# Patient Record
Sex: MALE | Race: WHITE | Employment: OTHER | URBAN - METROPOLITAN AREA
[De-identification: names, ages, dates, MRNs, and addresses within clinical notes are randomized per-mention and may not be internally consistent; named-entity substitution may affect disease eponyms.]

---

## 2018-01-04 PROBLEM — M48.062 SPINAL STENOSIS OF LUMBAR REGION WITH NEUROGENIC CLAUDICATION: Status: ACTIVE | Noted: 2018-01-04

## 2018-01-04 PROBLEM — M54.17 LUMBOSACRAL RADICULOPATHY AT L5: Status: ACTIVE | Noted: 2018-01-04

## 2019-11-04 ENCOUNTER — ANESTHESIA EVENT (OUTPATIENT)
Dept: OPERATING ROOM | Age: 69
End: 2019-11-04
Payer: MEDICARE

## 2019-11-05 ENCOUNTER — HOSPITAL ENCOUNTER (OUTPATIENT)
Age: 69
Setting detail: OUTPATIENT SURGERY
Discharge: HOME | End: 2019-11-05
Attending: OPHTHALMOLOGY | Admitting: OPHTHALMOLOGY
Payer: MEDICARE

## 2019-11-05 ENCOUNTER — ANESTHESIA (OUTPATIENT)
Dept: OPERATING ROOM | Age: 69
End: 2019-11-05
Payer: MEDICARE

## 2019-11-05 VITALS — OXYGEN SATURATION: 91 % | DIASTOLIC BLOOD PRESSURE: 91 MMHG | SYSTOLIC BLOOD PRESSURE: 149 MMHG | TEMPERATURE: 98.6 F

## 2019-11-05 VITALS
RESPIRATION RATE: 14 BRPM | DIASTOLIC BLOOD PRESSURE: 84 MMHG | HEIGHT: 71 IN | OXYGEN SATURATION: 96 % | BODY MASS INDEX: 34.02 KG/M2 | SYSTOLIC BLOOD PRESSURE: 153 MMHG | HEART RATE: 76 BPM | WEIGHT: 243 LBS

## 2019-11-05 PROBLEM — H26.9 RIGHT CATARACT: Status: ACTIVE | Noted: 2019-11-05

## 2019-11-05 LAB
EKG ATRIAL RATE: 131 BPM
EKG Q-T INTERVAL: 340 MS
EKG QRS DURATION: 86 MS
EKG QTC CALCULATION (BAZETT): 453 MS
EKG R AXIS: -47 DEGREES
EKG T AXIS: 11 DEGREES
EKG VENTRICULAR RATE: 107 BPM

## 2019-11-05 PROCEDURE — 3700000000 HC ANESTHESIA ATTENDED CARE: Performed by: OPHTHALMOLOGY

## 2019-11-05 PROCEDURE — 6360000002 HC RX W HCPCS: Performed by: NURSE ANESTHETIST, CERTIFIED REGISTERED

## 2019-11-05 PROCEDURE — 7100000010 HC PHASE II RECOVERY - FIRST 15 MIN: Performed by: OPHTHALMOLOGY

## 2019-11-05 PROCEDURE — 2500000003 HC RX 250 WO HCPCS: Performed by: OPHTHALMOLOGY

## 2019-11-05 PROCEDURE — 2580000003 HC RX 258: Performed by: ANESTHESIOLOGY

## 2019-11-05 PROCEDURE — 2500000003 HC RX 250 WO HCPCS: Performed by: NURSE ANESTHETIST, CERTIFIED REGISTERED

## 2019-11-05 PROCEDURE — 7100000000 HC PACU RECOVERY - FIRST 15 MIN: Performed by: OPHTHALMOLOGY

## 2019-11-05 PROCEDURE — 6370000000 HC RX 637 (ALT 250 FOR IP): Performed by: OPHTHALMOLOGY

## 2019-11-05 PROCEDURE — 93005 ELECTROCARDIOGRAM TRACING: CPT | Performed by: ANESTHESIOLOGY

## 2019-11-05 PROCEDURE — 2709999900 HC NON-CHARGEABLE SUPPLY: Performed by: OPHTHALMOLOGY

## 2019-11-05 PROCEDURE — 7100000001 HC PACU RECOVERY - ADDTL 15 MIN: Performed by: OPHTHALMOLOGY

## 2019-11-05 PROCEDURE — V2632 POST CHMBR INTRAOCULAR LENS: HCPCS | Performed by: OPHTHALMOLOGY

## 2019-11-05 PROCEDURE — 3600000003 HC SURGERY LEVEL 3 BASE: Performed by: OPHTHALMOLOGY

## 2019-11-05 PROCEDURE — 7100000011 HC PHASE II RECOVERY - ADDTL 15 MIN: Performed by: OPHTHALMOLOGY

## 2019-11-05 DEVICE — LENS INTOCU +21.0 DIOPT A CONSTANT 118.8 L13MM DIA6MM 0DEG: Type: IMPLANTABLE DEVICE | Site: EYE | Status: FUNCTIONAL

## 2019-11-05 RX ORDER — LABETALOL 20 MG/4 ML (5 MG/ML) INTRAVENOUS SYRINGE
PRN
Status: DISCONTINUED | OUTPATIENT
Start: 2019-11-05 | End: 2019-11-05 | Stop reason: SDUPTHER

## 2019-11-05 RX ORDER — FLURBIPROFEN SODIUM 0.3 MG/ML
1 SOLUTION/ DROPS OPHTHALMIC
Status: COMPLETED | OUTPATIENT
Start: 2019-11-05 | End: 2019-11-05

## 2019-11-05 RX ORDER — TETRACAINE HYDROCHLORIDE 5 MG/ML
SOLUTION OPHTHALMIC PRN
Status: DISCONTINUED | OUTPATIENT
Start: 2019-11-05 | End: 2019-11-05 | Stop reason: ALTCHOICE

## 2019-11-05 RX ORDER — CYCLOPENTOLATE HYDROCHLORIDE 10 MG/ML
1 SOLUTION/ DROPS OPHTHALMIC
Status: COMPLETED | OUTPATIENT
Start: 2019-11-05 | End: 2019-11-05

## 2019-11-05 RX ORDER — PHENYLEPHRINE HYDROCHLORIDE 100 MG/ML
1 SOLUTION/ DROPS OPHTHALMIC PRN
Status: DISCONTINUED | OUTPATIENT
Start: 2019-11-05 | End: 2019-11-05 | Stop reason: HOSPADM

## 2019-11-05 RX ORDER — SODIUM CHLORIDE, SODIUM LACTATE, POTASSIUM CHLORIDE, CALCIUM CHLORIDE 600; 310; 30; 20 MG/100ML; MG/100ML; MG/100ML; MG/100ML
INJECTION, SOLUTION INTRAVENOUS CONTINUOUS
Status: DISCONTINUED | OUTPATIENT
Start: 2019-11-05 | End: 2019-11-05 | Stop reason: HOSPADM

## 2019-11-05 RX ORDER — BALANCED SALT SOLUTION 6.4; .75; .48; .3; 3.9; 1.7 MG/ML; MG/ML; MG/ML; MG/ML; MG/ML; MG/ML
SOLUTION OPHTHALMIC PRN
Status: DISCONTINUED | OUTPATIENT
Start: 2019-11-05 | End: 2019-11-05 | Stop reason: ALTCHOICE

## 2019-11-05 RX ORDER — MIDAZOLAM HYDROCHLORIDE 1 MG/ML
INJECTION INTRAMUSCULAR; INTRAVENOUS PRN
Status: DISCONTINUED | OUTPATIENT
Start: 2019-11-05 | End: 2019-11-05 | Stop reason: SDUPTHER

## 2019-11-05 RX ORDER — TETRACAINE HYDROCHLORIDE 5 MG/ML
1 SOLUTION OPHTHALMIC ONCE
Status: COMPLETED | OUTPATIENT
Start: 2019-11-05 | End: 2019-11-05

## 2019-11-05 RX ORDER — FENTANYL CITRATE 50 UG/ML
INJECTION, SOLUTION INTRAMUSCULAR; INTRAVENOUS PRN
Status: DISCONTINUED | OUTPATIENT
Start: 2019-11-05 | End: 2019-11-05 | Stop reason: SDUPTHER

## 2019-11-05 RX ORDER — PHENYLEPHRINE HCL 2.5 %
1 DROPS OPHTHALMIC (EYE)
Status: COMPLETED | OUTPATIENT
Start: 2019-11-05 | End: 2019-11-05

## 2019-11-05 RX ADMIN — CYCLOPENTOLATE HYDROCHLORIDE 1 DROP: 10 SOLUTION/ DROPS OPHTHALMIC at 07:14

## 2019-11-05 RX ADMIN — CYCLOPENTOLATE HYDROCHLORIDE 1 DROP: 10 SOLUTION/ DROPS OPHTHALMIC at 07:20

## 2019-11-05 RX ADMIN — FENTANYL CITRATE 50 MCG: 50 INJECTION, SOLUTION INTRAMUSCULAR; INTRAVENOUS at 08:17

## 2019-11-05 RX ADMIN — CYCLOPENTOLATE HYDROCHLORIDE 1 DROP: 10 SOLUTION/ DROPS OPHTHALMIC at 07:25

## 2019-11-05 RX ADMIN — FLURBIPROFEN SODIUM 1 DROP: 0.3 SOLUTION/ DROPS OPHTHALMIC at 07:25

## 2019-11-05 RX ADMIN — PHENYLEPHRINE HYDROCHLORIDE 1 DROP: 25 SOLUTION/ DROPS OPHTHALMIC at 07:14

## 2019-11-05 RX ADMIN — FLURBIPROFEN SODIUM 1 DROP: 0.3 SOLUTION/ DROPS OPHTHALMIC at 07:15

## 2019-11-05 RX ADMIN — FENTANYL CITRATE 50 MCG: 50 INJECTION, SOLUTION INTRAMUSCULAR; INTRAVENOUS at 08:19

## 2019-11-05 RX ADMIN — TETRACAINE HYDROCHLORIDE 1 DROP: 5 SOLUTION OPHTHALMIC at 07:16

## 2019-11-05 RX ADMIN — LABETALOL 20 MG/4 ML (5 MG/ML) INTRAVENOUS SYRINGE 5 MG: at 08:23

## 2019-11-05 RX ADMIN — PHENYLEPHRINE HYDROCHLORIDE 1 DROP: 25 SOLUTION/ DROPS OPHTHALMIC at 07:20

## 2019-11-05 RX ADMIN — SODIUM CHLORIDE, POTASSIUM CHLORIDE, SODIUM LACTATE AND CALCIUM CHLORIDE: 600; 310; 30; 20 INJECTION, SOLUTION INTRAVENOUS at 07:13

## 2019-11-05 RX ADMIN — FLURBIPROFEN SODIUM 1 DROP: 0.3 SOLUTION/ DROPS OPHTHALMIC at 07:20

## 2019-11-05 RX ADMIN — PHENYLEPHRINE HYDROCHLORIDE 1 DROP: 25 SOLUTION/ DROPS OPHTHALMIC at 07:25

## 2019-11-05 RX ADMIN — MIDAZOLAM 2 MG: 1 INJECTION INTRAMUSCULAR; INTRAVENOUS at 08:17

## 2019-11-05 ASSESSMENT — PULMONARY FUNCTION TESTS
PIF_VALUE: 1
PIF_VALUE: 0
PIF_VALUE: 1
PIF_VALUE: 0

## 2019-11-05 ASSESSMENT — PAIN SCALES - GENERAL
PAINLEVEL_OUTOF10: 0

## 2019-11-05 ASSESSMENT — PAIN - FUNCTIONAL ASSESSMENT: PAIN_FUNCTIONAL_ASSESSMENT: 0-10

## 2020-09-16 RX ORDER — MULTIVIT WITH MINERALS/LUTEIN
250 TABLET ORAL DAILY
COMMUNITY

## 2020-09-17 ENCOUNTER — HOSPITAL ENCOUNTER (OUTPATIENT)
Age: 70
Discharge: HOME | End: 2020-09-19
Payer: MEDICARE

## 2020-09-17 PROCEDURE — U0003 INFECTIOUS AGENT DETECTION BY NUCLEIC ACID (DNA OR RNA); SEVERE ACUTE RESPIRATORY SYNDROME CORONAVIRUS 2 (SARS-COV-2) (CORONAVIRUS DISEASE [COVID-19]), AMPLIFIED PROBE TECHNIQUE, MAKING USE OF HIGH THROUGHPUT TECHNOLOGIES AS DESCRIBED BY CMS-2020-01-R: HCPCS

## 2020-09-19 ENCOUNTER — ANESTHESIA EVENT (OUTPATIENT)
Dept: OPERATING ROOM | Age: 70
End: 2020-09-19
Payer: MEDICARE

## 2020-09-19 LAB
SARS-COV-2: NOT DETECTED
SOURCE: NORMAL

## 2020-09-19 NOTE — ANESTHESIA PRE PROCEDURE
Department of Anesthesiology  Preprocedure Note       Name:  Mily Abdalla   Age:  65 y.o.  :  1950                                          MRN:  [id number]         Date:  2020      Surgeon: Nickolas Carreon):  Jenaro Hathaway MD    Procedure: LEFT EYE CATARACT EMULSIFICATION IOL IMPLANT (PT REQUESTS TO BE LAST) (Left )    Medications prior to admission:   Prior to Admission medications    Medication Sig Start Date End Date Taking?  Authorizing Provider   Ascorbic Acid (VITAMIN C) 250 MG tablet Take 250 mg by mouth daily   Yes Historical Provider, MD   olmesartan-hydrochlorothiazide (BENICAR HCT) 40-12.5 MG per tablet Take 1 tablet by mouth Daily with lunch     Historical Provider, MD   diltiazem (DILACOR XR) 240 MG XR capsule Take 240 mg by mouth nightly     Historical Provider, MD       Current medications:    Current Facility-Administered Medications   Medication Dose Route Frequency Provider Last Rate Last Dose    lactated ringers infusion   Intravenous Continuous Darianaignacio Casanova MD        flurbiprofen (OCUFEN) 0.03 % ophthalmic solution 1 drop  1 drop Left Eye Q5 Min Jenaro Hathaway MD        phenylephrine (MYDFRIN) 2.5 % ophthalmic solution 1 drop  1 drop Left Eye Q5 Min Jenaro Hathaway MD        tetracaine (TETRAVISC) 0.5 % ophthalmic solution 1 drop  1 drop Left Eye Once Jenaro Hathaway MD        cyclopentolate (CYCLOGYL) 1 % ophthalmic solution 1 drop  1 drop Left Eye Q5 Min Jenaro Hathaway MD        phenylephrine (ADRIANE-SYNEPHRINE) 10 % ophthalmic solution 1 drop  1 drop Left Eye PRN Jenaro Hathaway MD           Allergies:  No Known Allergies    Problem List:    Patient Active Problem List   Diagnosis Code    DJD (degenerative joint disease) of knee M17.10    Right knee pain M25.561    Sleep apnea G47.30    HTN (hypertension) I10    Hypercholesterolemia E78.00    Atrial fibrillation (HCC) I48.91    H/O total knee replacement Z96.659    Spinal stenosis of lumbar region with neurogenic claudication M48.062    Lumbosacral radiculopathy at L5 M54.17    Right cataract H26.9       Past Medical History:        Diagnosis Date    A-fib [facility])     x1 isolated episode in 2009 ( controlled with diltiazem)  pt converted back on his own     Arthritis     Colon polyps     Gout     HTN (hypertension)     Hypercholesterolemia     Sleep apnea     uses a c-pap at night       Past Surgical History:        Procedure Laterality Date    COLONOSCOPY      ENDOSCOPY, COLON, DIAGNOSTIC      INTRACAPSULAR CATARACT EXTRACTION Right 11/5/2019    RIGHT EYE CATARACT EMULSIFICATION IOL IMPLANT performed by Ag Mckeon MD at [address] Left 2007    knee-Yady    JOINT REPLACEMENT Right 2014    knee    REVISION TOTAL KNEE ARTHROPLASTY      TONSILLECTOMY         Social History:    Social History     Tobacco Use    Smoking status: Never Smoker    Smokeless tobacco: Never Used   Substance Use Topics    Alcohol use:  Yes     Alcohol/week: 0.0 standard drinks     Types: 1 - 2 Cans of beer per week     Comment: per week                                Counseling given: Not Answered      Vital Signs (Current):   Vitals:    09/16/20 1103 09/22/20 0920   BP:  (!) 159/71   Pulse:  61   Resp:  20   Temp:  97.8 °F (36.6 °C)   TempSrc:  Temporal   SpO2:  96%   Weight: 240 lb (108.9 kg) 243 lb (110.2 kg)   Height: 5' 11\" (1.803 m) 5' 11\" (1.803 m)                                              BP Readings from Last 3 Encounters:   09/22/20 (!) 159/71   11/05/19 (!) 149/91   11/05/19 (!) 153/84       NPO Status: Time of last liquid consumption: 2100                        Time of last solid consumption: 2100                        Date of last liquid consumption: 09/21/20                        Date of last solid food consumption: 09/21/20    BMI:   Wt Readings from Last 3 Encounters:   09/22/20 243 lb (110.2 kg)   11/05/19 243 lb (110.2 kg)   01/30/18 242 lb (109.8 kg)     Body mass Review:  Chart reviewed per routine on September 19, 2020 at 8:14 AM by Tj Lange MD.  (Final assessment and plan per day of surgery team.)    DOS STAFF ADDENDUM:    Pt seen and examined, chart reviewed (including anesthesia, drug and allergy history). Anesthetic plan, risks, benefits, alternatives, and personnel involved discussed with patient. Patient verbalized an understanding and agrees to proceed. Plan discussed with care team members and agreed upon.     Silvina Rosado MD  Staff Anesthesiologist  9:29 AM

## 2020-09-21 NOTE — H&P
History and Physical    Patient's Name/Date of Birth: Brian White / 1950 (67 y.o.)    Date: September 21, 2020     Chief Complaint: Decreased vision of the left eye    HPI: Mature left cataract with decreased vision . Risks and complications as well as options and benefits were discussed with the patient and he has elected to proceed with left cataract extraction with intra ocular lens implant. Past Medical History:   Diagnosis Date    A-fib [facility])     x1 isolated episode in 2009 ( controlled with diltiazem)  pt converted back on his own     Arthritis     Colon polyps     Gout     HTN (hypertension)     Hypercholesterolemia     Sleep apnea     uses a c-pap at night       Past Surgical History:   Procedure Laterality Date    COLONOSCOPY      ENDOSCOPY, COLON, DIAGNOSTIC      INTRACAPSULAR CATARACT EXTRACTION Right 11/5/2019    RIGHT EYE CATARACT EMULSIFICATION IOL IMPLANT performed by Carla Hardy MD at [address] Left 2007    knee-Yady    JOINT REPLACEMENT Right 2014    knee    REVISION TOTAL KNEE ARTHROPLASTY      TONSILLECTOMY         Prior to Admission medications    Medication Sig Start Date End Date Taking? Authorizing Provider   Ascorbic Acid (VITAMIN C) 250 MG tablet Take 250 mg by mouth daily   Yes Historical Provider, MD   olmesartan-hydrochlorothiazide (BENICAR HCT) 40-12.5 MG per tablet Take 1 tablet by mouth Daily with lunch     Historical Provider, MD   diltiazem (DILACOR XR) 240 MG XR capsule Take 240 mg by mouth nightly     Historical Provider, MD       Patient has no known allergies.     Family History   Problem Relation Age of Onset    Arthritis Mother     Heart Disease Mother        Social History     Socioeconomic History    Marital status:      Spouse name: Not on file    Number of children: Not on file    Years of education: Not on file    Highest education level: Not on file   Occupational History    Not on file   Social Needs  Financial resource strain: Not on file   Moisés-Sofy insecurity     Worry: Not on file     Inability: Not on file   [employer] needs     Medical: Not on file     Non-medical: Not on file   Tobacco Use    Smoking status: Never Smoker    Smokeless tobacco: Never Used   Substance and Sexual Activity    Alcohol use: Yes     Alcohol/week: 0.0 standard drinks     Types: 1 - 2 Cans of beer per week     Comment: per week    Drug use: No    Sexual activity: Not on file   Lifestyle    Physical activity     Days per week: Not on file     Minutes per session: Not on file    Stress: Not on file   Relationships    Social connections     Talks on phone: Not on file     Gets together: Not on file     Attends Buddhism service: Not on file     Active member of club or organization: Not on file     Attends meetings of clubs or organizations: Not on file     Relationship status: Not on file    Intimate partner violence     Fear of current or ex partner: Not on file     Emotionally abused: Not on file     Physically abused: Not on file     Forced sexual activity: Not on file   Other Topics Concern    Not on file   Social History Narrative    Not on file       Review of Systems:   CONSTITUTIONAL: Oriented to person, place and time  EYES:  Mature left cataract with decreased vision effecting reading, driving and all routine home activities.   Visual acuity is 20/60   HEENT:  negative  RESPIRATORY:  negative  CARDIOVASCULAR:  negative  GASTROINTESTINAL:  negative  GENITOURINARY:  negative  INTEGUMENT/BREAST:  negative  HEMATOLOGIC/LYMPHATIC:  negative  ALLERGIC/IMMUNOLOGIC:  negative  ENDOCRINE:  negative  MUSCULOSKELETAL:  negative  NEUROLOGICAL:  negative  BEHAVIOR/PSYCH:  negative    Physical Exam:  Vitals:    09/16/20 1103   Weight: 240 lb (108.9 kg)   Height: 5' 11\" (1.803 m)       CONSTITUTIONAL:  awake, alert, cooperative, no apparent distress, and appears stated age  EYES:  Lids and lashes normal, pupils equal, round and reactive to light, extra ocular muscles intact, sclera clear, conjunctiva normal  ENT:  Normocephalic, without obvious abnormality, atraumatic, sinuses nontender on palpation, external ears without lesions, oral pharynx with moist mucus membranes, tonsils without erythema or exudates, gums normal and good dentition. NECK:  Supple, symmetrical, trachea midline, no adenopathy, thyroid symmetric, not enlarged and no tenderness, skin normal  HEMATOLOGIC/LYMPHATICS:  no cervical lymphadenopathy and no supraclavicular lymphadenopathy  BACK:  Symmetric, no curvature, spinous processes are non-tender on palpation, paraspinous muscles are non-tender on palpation, no costal vertebral tenderness  LUNGS:  No increased work of breathing, good air exchange, clear to auscultation bilaterally, no crackles or wheezing  CARDIOVASCULAR:  Normal apical impulse, regular rate and rhythm, normal S1 and S2, no S3 or S4, and no murmur noted  ABDOMEN:  No scars, normal bowel sounds, soft, non-distended, non-tender, no masses palpated, no hepatosplenomegally  CHEST/BREASTS:  Breasts symmetrical, skin without lesion(s), no nipple retraction or dimpling, no nipple discharge, no masses palpated, no axillary or supraclavicular adenopathy  GENITAL/URINARY:  deferred  MUSCULOSKELETAL:  There is no redness, warmth, or swelling of the joints. Full range of motion noted. Motor strength is 5 out of 5 all extremities bilaterally. Tone is normal.  NEUROLOGIC:  Awake, alert, oriented to name, place and time. Cranial nerves II-XII are grossly intact. Motor is 5 out of 5 bilaterally. Cerebellar finger to nose, heel to shin intact. Sensory is intact. Babinski down going, Romberg negative, and gait is normal.  SKIN:  no bruising or bleeding, normal skin color, texture, turgor and no redness, warmth, or swelling    Assessment:  Active Problems:    * No active hospital problems. *  Resolved Problems:    * No resolved hospital problems. *      Plan:  1.  Left cataract extraction with intra ocular lens implant    Electronically signed by Wes Deluca MD on 9/21/20 at 4:57 PM EDT

## 2020-09-22 ENCOUNTER — HOSPITAL ENCOUNTER (OUTPATIENT)
Age: 70
Setting detail: OUTPATIENT SURGERY
Discharge: HOME | End: 2020-09-22
Attending: OPHTHALMOLOGY | Admitting: OPHTHALMOLOGY
Payer: MEDICARE

## 2020-09-22 ENCOUNTER — ANESTHESIA (OUTPATIENT)
Dept: OPERATING ROOM | Age: 70
End: 2020-09-22
Payer: MEDICARE

## 2020-09-22 VITALS
RESPIRATION RATE: 16 BRPM | HEART RATE: 60 BPM | SYSTOLIC BLOOD PRESSURE: 140 MMHG | HEIGHT: 71 IN | DIASTOLIC BLOOD PRESSURE: 70 MMHG | OXYGEN SATURATION: 95 % | WEIGHT: 243 LBS | TEMPERATURE: 97 F | BODY MASS INDEX: 34.02 KG/M2

## 2020-09-22 VITALS
OXYGEN SATURATION: 99 % | RESPIRATION RATE: 14 BRPM | DIASTOLIC BLOOD PRESSURE: 81 MMHG | SYSTOLIC BLOOD PRESSURE: 147 MMHG

## 2020-09-22 PROBLEM — H26.9 LEFT CATARACT: Status: ACTIVE | Noted: 2020-09-22

## 2020-09-22 PROCEDURE — 2580000003 HC RX 258: Performed by: ANESTHESIOLOGY

## 2020-09-22 PROCEDURE — 2500000003 HC RX 250 WO HCPCS: Performed by: NURSE ANESTHETIST, CERTIFIED REGISTERED

## 2020-09-22 PROCEDURE — 7100000010 HC PHASE II RECOVERY - FIRST 15 MIN: Performed by: OPHTHALMOLOGY

## 2020-09-22 PROCEDURE — 2500000003 HC RX 250 WO HCPCS: Performed by: OPHTHALMOLOGY

## 2020-09-22 PROCEDURE — 6370000000 HC RX 637 (ALT 250 FOR IP): Performed by: OPHTHALMOLOGY

## 2020-09-22 PROCEDURE — 7100000011 HC PHASE II RECOVERY - ADDTL 15 MIN: Performed by: OPHTHALMOLOGY

## 2020-09-22 PROCEDURE — V2632 POST CHMBR INTRAOCULAR LENS: HCPCS | Performed by: OPHTHALMOLOGY

## 2020-09-22 PROCEDURE — 2709999900 HC NON-CHARGEABLE SUPPLY: Performed by: OPHTHALMOLOGY

## 2020-09-22 PROCEDURE — 3600000003 HC SURGERY LEVEL 3 BASE: Performed by: OPHTHALMOLOGY

## 2020-09-22 PROCEDURE — 6360000002 HC RX W HCPCS: Performed by: NURSE ANESTHETIST, CERTIFIED REGISTERED

## 2020-09-22 PROCEDURE — 3700000000 HC ANESTHESIA ATTENDED CARE: Performed by: OPHTHALMOLOGY

## 2020-09-22 DEVICE — LENS INTOCU +21.0 DIOPT A CONSTANT 118.8 L13MM DIA6MM 0DEG: Type: IMPLANTABLE DEVICE | Site: EYE | Status: FUNCTIONAL

## 2020-09-22 RX ORDER — PHENYLEPHRINE HCL 2.5 %
1 DROPS OPHTHALMIC (EYE)
Status: COMPLETED | OUTPATIENT
Start: 2020-09-22 | End: 2020-09-22

## 2020-09-22 RX ORDER — BALANCED SALT SOLUTION 6.4; .75; .48; .3; 3.9; 1.7 MG/ML; MG/ML; MG/ML; MG/ML; MG/ML; MG/ML
SOLUTION OPHTHALMIC PRN
Status: DISCONTINUED | OUTPATIENT
Start: 2020-09-22 | End: 2020-09-22 | Stop reason: ALTCHOICE

## 2020-09-22 RX ORDER — CYCLOPENTOLATE HYDROCHLORIDE 10 MG/ML
1 SOLUTION/ DROPS OPHTHALMIC
Status: COMPLETED | OUTPATIENT
Start: 2020-09-22 | End: 2020-09-22

## 2020-09-22 RX ORDER — TETRACAINE HYDROCHLORIDE 5 MG/ML
1 SOLUTION OPHTHALMIC ONCE
Status: COMPLETED | OUTPATIENT
Start: 2020-09-22 | End: 2020-09-22

## 2020-09-22 RX ORDER — TETRACAINE HYDROCHLORIDE 5 MG/ML
SOLUTION OPHTHALMIC PRN
Status: DISCONTINUED | OUTPATIENT
Start: 2020-09-22 | End: 2020-09-22 | Stop reason: ALTCHOICE

## 2020-09-22 RX ORDER — MIDAZOLAM HYDROCHLORIDE 1 MG/ML
INJECTION INTRAMUSCULAR; INTRAVENOUS PRN
Status: DISCONTINUED | OUTPATIENT
Start: 2020-09-22 | End: 2020-09-22 | Stop reason: SDUPTHER

## 2020-09-22 RX ORDER — ALFENTANIL HYDROCHLORIDE 500 UG/ML
INJECTION INTRAVENOUS PRN
Status: DISCONTINUED | OUTPATIENT
Start: 2020-09-22 | End: 2020-09-22 | Stop reason: SDUPTHER

## 2020-09-22 RX ORDER — FLURBIPROFEN SODIUM 0.3 MG/ML
1 SOLUTION/ DROPS OPHTHALMIC
Status: COMPLETED | OUTPATIENT
Start: 2020-09-22 | End: 2020-09-22

## 2020-09-22 RX ORDER — SODIUM CHLORIDE, SODIUM LACTATE, POTASSIUM CHLORIDE, CALCIUM CHLORIDE 600; 310; 30; 20 MG/100ML; MG/100ML; MG/100ML; MG/100ML
INJECTION, SOLUTION INTRAVENOUS CONTINUOUS
Status: DISCONTINUED | OUTPATIENT
Start: 2020-09-22 | End: 2020-09-22 | Stop reason: HOSPADM

## 2020-09-22 RX ORDER — PHENYLEPHRINE HYDROCHLORIDE 100 MG/ML
1 SOLUTION/ DROPS OPHTHALMIC PRN
Status: DISCONTINUED | OUTPATIENT
Start: 2020-09-22 | End: 2020-09-22 | Stop reason: HOSPADM

## 2020-09-22 RX ADMIN — PHENYLEPHRINE HYDROCHLORIDE 1 DROP: 25 SOLUTION/ DROPS OPHTHALMIC at 09:15

## 2020-09-22 RX ADMIN — CYCLOPENTOLATE HYDROCHLORIDE 1 DROP: 10 SOLUTION/ DROPS OPHTHALMIC at 09:05

## 2020-09-22 RX ADMIN — FLURBIPROFEN SODIUM 1 DROP: 0.3 SOLUTION/ DROPS OPHTHALMIC at 09:15

## 2020-09-22 RX ADMIN — SODIUM CHLORIDE, POTASSIUM CHLORIDE, SODIUM LACTATE AND CALCIUM CHLORIDE: 600; 310; 30; 20 INJECTION, SOLUTION INTRAVENOUS at 09:30

## 2020-09-22 RX ADMIN — CYCLOPENTOLATE HYDROCHLORIDE 1 DROP: 10 SOLUTION/ DROPS OPHTHALMIC at 09:10

## 2020-09-22 RX ADMIN — TETRACAINE HYDROCHLORIDE 1 DROP: 25 LIQUID OPHTHALMIC at 09:31

## 2020-09-22 RX ADMIN — PHENYLEPHRINE HYDROCHLORIDE 1 DROP: 25 SOLUTION/ DROPS OPHTHALMIC at 09:10

## 2020-09-22 RX ADMIN — ALFENTANIL HYDROCHLORIDE 250 MCG: 500 INJECTION INTRAVENOUS at 10:08

## 2020-09-22 RX ADMIN — FLURBIPROFEN SODIUM 1 DROP: 0.3 SOLUTION/ DROPS OPHTHALMIC at 09:05

## 2020-09-22 RX ADMIN — ALFENTANIL HYDROCHLORIDE 250 MCG: 500 INJECTION INTRAVENOUS at 10:13

## 2020-09-22 RX ADMIN — MIDAZOLAM 1 MG: 1 INJECTION INTRAMUSCULAR; INTRAVENOUS at 10:06

## 2020-09-22 RX ADMIN — ALFENTANIL HYDROCHLORIDE 250 MCG: 500 INJECTION INTRAVENOUS at 10:06

## 2020-09-22 RX ADMIN — ALFENTANIL HYDROCHLORIDE 250 MCG: 500 INJECTION INTRAVENOUS at 10:09

## 2020-09-22 RX ADMIN — MIDAZOLAM 1 MG: 1 INJECTION INTRAMUSCULAR; INTRAVENOUS at 10:05

## 2020-09-22 RX ADMIN — PHENYLEPHRINE HYDROCHLORIDE 1 DROP: 25 SOLUTION/ DROPS OPHTHALMIC at 09:05

## 2020-09-22 RX ADMIN — CYCLOPENTOLATE HYDROCHLORIDE 1 DROP: 10 SOLUTION/ DROPS OPHTHALMIC at 09:15

## 2020-09-22 RX ADMIN — FLURBIPROFEN SODIUM 1 DROP: 0.3 SOLUTION/ DROPS OPHTHALMIC at 09:10

## 2020-09-22 ASSESSMENT — PAIN SCALES - GENERAL
PAINLEVEL_OUTOF10: 0

## 2020-09-22 ASSESSMENT — PAIN - FUNCTIONAL ASSESSMENT: PAIN_FUNCTIONAL_ASSESSMENT: 0-10

## 2020-09-22 NOTE — OP NOTE
into the capsular bag and dialed to 3 and 9 o'clock positions. Healon was removed from in front of and behind the lens. The lens was found to be well centered, well positioned in the bag and stable. The wound was checked and found to be airtight and watertight. The lid speculum was removed and the drape was removed. The patient was brought to the recovery room in excellent condition, given postoperative instructions, and discharge in excellent condition.    Operative Note      Patient: Wendy Causey  YOB: 1950  MRN: [id number]    Date of Procedure: 9/22/2020    Pre-Op Diagnosis: LEFT EYE CATARACT    Post-Op Diagnosis: Same       Procedure(s):  LEFT EYE CATARACT EMULSIFICATION IOL IMPLANT (PT REQUESTS TO BE LAST)    Surgeon(s):  Tom Haines MD    Assistant:   * No surgical staff found *    Anesthesia: Monitor Anesthesia Care    Estimated Blood Loss (mL): Minimal    Complications: None    Specimens:   * No specimens in log *    Implants:  * No implants in log *      Drains: * No LDAs found *    Findings: Left cataract     Detailed Description of Procedure:   Left cataract extraction with intra ocular lens implant    Electronically signed by Grayce Merlin, MD on 9/22/2020 at 10:10 AM

## 2020-09-22 NOTE — H&P
Update History & Physical    The patient's History and Physical of 9 / 21 / 2020 was reviewed with the patient and there were no significant changes. I examined the patient and there were no significant changes from the previous History and Physical.    Plan: The risk, benefits, expected outcome, and alternative to the recommended procedure have been discussed with the patient. Patient understands and wants to proceed with the procedure.     Electronically signed by Robinson Caputo MD on 9/22/20 at 10:09 AM EDT

## 2021-12-03 ENCOUNTER — HOSPITAL ENCOUNTER (OUTPATIENT)
Dept: CT IMAGING | Age: 71
Discharge: HOME | End: 2021-12-03
Payer: MEDICARE

## 2021-12-03 DIAGNOSIS — I65.22 OCCLUSION OF LEFT CAROTID ARTERY: ICD-10-CM

## 2021-12-03 LAB
BUN BLDV-MCNC: 18 MG/DL (ref 6–23)
CREAT SERPL-MCNC: 1.1 MG/DL (ref 0.7–1.2)
GFR AFRICAN AMERICAN: >60
GFR NON-AFRICAN AMERICAN: >60 ML/MIN/1.73

## 2021-12-03 PROCEDURE — 84520 ASSAY OF UREA NITROGEN: CPT

## 2021-12-03 PROCEDURE — 82565 ASSAY OF CREATININE: CPT

## 2021-12-03 PROCEDURE — 6360000004 HC RX CONTRAST MEDICATION: Performed by: RADIOLOGY

## 2021-12-03 PROCEDURE — 36415 COLL VENOUS BLD VENIPUNCTURE: CPT

## 2021-12-03 PROCEDURE — 70498 CT ANGIOGRAPHY NECK: CPT

## 2021-12-03 RX ADMIN — IOPAMIDOL 75 ML: 755 INJECTION, SOLUTION INTRAVENOUS at 14:06

## 2022-06-29 ENCOUNTER — TELEPHONE (OUTPATIENT)
Dept: SLEEP MEDICINE | Age: 72
End: 2022-06-29

## 2022-06-29 NOTE — TELEPHONE ENCOUNTER
Pt called in asking if we received referral from her PCP.   We did receive referral and appt was made for 8/9

## 2022-08-09 ENCOUNTER — OFFICE VISIT (OUTPATIENT)
Dept: SLEEP CENTER | Age: 72
End: 2022-08-09
Payer: MEDICARE

## 2022-08-09 VITALS
RESPIRATION RATE: 16 BRPM | HEIGHT: 71 IN | HEART RATE: 65 BPM | SYSTOLIC BLOOD PRESSURE: 148 MMHG | BODY MASS INDEX: 32.7 KG/M2 | DIASTOLIC BLOOD PRESSURE: 88 MMHG | OXYGEN SATURATION: 97 % | WEIGHT: 233.6 LBS

## 2022-08-09 DIAGNOSIS — E66.9 OBESITY, UNSPECIFIED CLASSIFICATION, UNSPECIFIED OBESITY TYPE, UNSPECIFIED WHETHER SERIOUS COMORBIDITY PRESENT: ICD-10-CM

## 2022-08-09 DIAGNOSIS — I48.91 ATRIAL FIBRILLATION, UNSPECIFIED TYPE (HCC): ICD-10-CM

## 2022-08-09 DIAGNOSIS — G47.33 OBSTRUCTIVE SLEEP APNEA: Primary | ICD-10-CM

## 2022-08-09 PROCEDURE — 1123F ACP DISCUSS/DSCN MKR DOCD: CPT | Performed by: NURSE PRACTITIONER

## 2022-08-09 PROCEDURE — 99204 OFFICE O/P NEW MOD 45 MIN: CPT | Performed by: NURSE PRACTITIONER

## 2022-08-09 RX ORDER — CLOPIDOGREL BISULFATE 75 MG/1
75 TABLET ORAL DAILY
COMMUNITY

## 2022-08-09 ASSESSMENT — SLEEP AND FATIGUE QUESTIONNAIRES
HOW LIKELY ARE YOU TO NOD OFF OR FALL ASLEEP WHILE WATCHING TV: 0
HOW LIKELY ARE YOU TO NOD OFF OR FALL ASLEEP WHILE SITTING AND TALKING TO SOMEONE: 0
HOW LIKELY ARE YOU TO NOD OFF OR FALL ASLEEP WHILE SITTING INACTIVE IN A PUBLIC PLACE: 0
HOW LIKELY ARE YOU TO NOD OFF OR FALL ASLEEP WHILE SITTING AND READING: 0
HOW LIKELY ARE YOU TO NOD OFF OR FALL ASLEEP WHILE SITTING QUIETLY AFTER LUNCH WITHOUT ALCOHOL: 0
HOW LIKELY ARE YOU TO NOD OFF OR FALL ASLEEP WHILE LYING DOWN TO REST IN THE AFTERNOON WHEN CIRCUMSTANCES PERMIT: 0
HOW LIKELY ARE YOU TO NOD OFF OR FALL ASLEEP WHEN YOU ARE A PASSENGER IN A CAR FOR AN HOUR WITHOUT A BREAK: 0
HOW LIKELY ARE YOU TO NOD OFF OR FALL ASLEEP IN A CAR, WHILE STOPPED FOR A FEW MINUTES IN TRAFFIC: 0
ESS TOTAL SCORE: 0

## 2022-08-09 NOTE — PATIENT INSTRUCTIONS
It was a pleasure seeing you today Olivia Ovalle! Summary of Today's Visit            - You have been ordered a Home Sleep Study. The Sleep lab should call you about arranging a time for you to  the Home Sleep Study device and how to use it. A copy of the Home sleep study device instruction sheet is included (towards the back of this packet) and the sleep lab number is listed directly below. [facility] Number  [phone]      -Please do not drive when you are sleepy   -Please sign a request of records consent form, so that we may receive all of your previous sleep study reports and clinical notes, if they were not available today. - Please schedule a 2 month follow up today, to go over results        It is always advised that you check with your insurance company to determine how your study will be covered. For general questions or scheduling issues, call our nurse        Cesar Muse [phone] option 2  f- [phone]           The general categories for the treatment of Sleep Apnea include:     1. Supportive Care  -Elevate the head of the bed   -Avoid sleeping on your back      2. Weight loss  -Start a healthy weight loss program  -Consider a referral to Weight Loss Medicine Clinic     3. Oral Appliance \"Mouth Piece\"  (Mandibular Advancement Device)     4. Positive pressure therapy with a machine and a mask (CPAP or BiPAP)     5. Different kinds of surgeries, including nasal surgery, surgery of the throat/windpipe, and nerve stimulation therapy (INSPIRE). Helpful Web Sites: For patients with ALL SLEEP DISORDERS: American Academy of Sleep Medicine http://sleepeducation. org; or [employer]: https://sleepfoundation. org  For patients with HUMBERTO: American Sleep Apnea Association: http://segovia.org/. org  For patients with RLS: RLS Foundation: Homero.carmencita  For patients with INSOMNIA: https://www.stanley.org/. org/articles/sleep/insomnia-causes-and-cures. htm  For patients with DEPRESSION: Stunn.com.Netseer. com/depression         Sleep Medicine Terms     CPAP - Continuous Positive Airway Pressure - 1 set pressure (i.e. 7 cwp)  APAP - Automatic Positive Airway Pressure - PAP device determines in real time what pressure will work best confined to certain settings. (I.e. 4-15 cwp)  BiPAP - Bilevel Positive Airway Pressure - Higher pressure on taking a breath and lower pressure upon breathing out (i.e. 10/6 cwp)  CWP - Centimeters of water pressure   JURGEN - [facility] who sends you your CPAP/APAP/BiPAP and supplies. PSG - Polysomnogram - Overnight Sleep study  Titration Study - Overnight sleep study with the PAP device tired at different settings  Split Night study - PSG and titration study       Please note that complications of HUMBERTO ,if present, and not treated can increase risk for: systemic hypertension , pulmonary hypertension (high blood pressure in the lungs), cardiovascular morbidities (heart attack and stroke), car accidents and all cause mortality (increased risk of death). Treatment for sleep apnea, if present, can reduce these risks. Sleep Studies: About This Test  What is it? Sleep studies are tests that evaluate your breathing during sleep. Sleep studies you do at home can be done with portable equipment. Why is this test done? Sleep studies are done for people who say that sleep isn't restful or that they are tired all day. These studies can help find sleep problems, such as:  Sleep apnea. This means that an adult regularly stops breathing during sleep for 10 seconds or longer. Excessive snoring. How is the test done? Soft elastic belts will be placed around your chest measure your breathing. Your blood oxygen levels will be checked by a small clip (oximeter) placed on the tip of your index finger.     How long does the test take?  Overnight at home    What happens after the test?  You may not sleep well during the test and may be tired the next day. After your sleep problem has been identified, you may need a second study if your doctor orders treatment such as CPAP. Follow-up care is a key part of your treatment and safety. Be sure to make and go to all appointments, and call your doctor if you are having problems. It's also a good idea to keep a list of the medicines you take. Ask your doctor when you can expect to have your test results. © 2510-7001 Health[employer]. Care instructions adapted under license by [facility] ([facility]). If you have questions about a medical condition or this instruction, always ask your healthcare professional. [address] any warranty or liability for your use of this information.

## 2022-08-09 NOTE — PROGRESS NOTES
[facility] Sleep Medicine    Patient Name: Robin Barba  Age: 74 y.o.   : 1950    Date of Visit: 8/10/22      HPI   Robin Barba is a 74 y.o. male with  has a past medical history of A-fib ([address] Arthritis, Colon polyps, Gout, HTN (hypertension), Hypercholesterolemia, and Sleep apnea. He presents as a new patient to Sleep Clinic, referred by Dr. Buck Felton, for Sleep Apnea   . Patient presents to establish with sleep medicine for the management of his longstanding HUMBERTO, diagnosed 20 years ago. He is unsure of where his first and only sleep study was performed 20 years ago, but believes he was severe at the time. He was prescribed CPAP following and uses nightly. Patient's current CPAP device is from  and set to a pressure of 12 cmH2O. He is hoping to obtain a new CPAP device, as well as get set up with [employer] for new supplies. It has been [place] while\" since he received new supplies- was using a backup stock of masks he had but is now running low. Previously being serviced by [employer]. Using a nasal pillow mask with comfort. Patient estimates that he sleeps 7-7.5 hours each night. No difficulty falling asleep (within 30 min) and denies use of sleeping pills. Rarely wakes up through the night, and feels refreshed upon awakening. Patient does not typically take planned naps, but will doze off after dinner occasionally. This just started happening within the last several years. Usually sleeps on his back. Drinks 2 cups of coffee in the am, denies smoking. With CPAP use, he notes resolution of snoring and witnessed apneas. He denies AM headaches, only mild sleepiness during the day, difficulty with memory, or drowsy driving. Patient \"can't sleep without CPAP\". This past year, patient has intentionally lost about 10-15 pounds. Sleep Study History: No record of previous sleep study.     Bed time:  12:30 - 1 am   Wake time:  8 :30 am   Sleep Latency (min):  <30 min  Sleep Medications: None  Awakenings: Rarely wakes up   Estimated Sleep time (hours):  7-7.5  Daytime Naps: N, after dinner he may just doze off for a few minutes -- last few years   Sleep disturbances: None  Sleep Location: Bed  Sleep environment: Sleeps on back  Occupation: Retired    [employer] before bed: Dark environment   Caffeine:   2 cups in am  Coffee    0   Soda    0   Tea  Alcohol: rare  Tobacco: N     Sleep ROS:     Snoring: Y, without CPAP  Witnessed apneas: y, without CPAP  AM Headache: N  Dry Mouth: N  Daytime Sleepiness: Y- mild  Difficulty remembering things: N  MVA  or near miss accident due to sleepiness in the past? N  Tonsillectomy? Y, removed  Have you lost or gained weight recently?  Lost 12 pounds in the last year       PARASOMNIAS/ NARCOLEPSY:  Hypnogogic/Hynopompic Hallucinations: N   Sleep paralysis: N  Cataplexy: N   REM behavior symptoms: N  Sleep Walking: N  Sleep Talking: N  RLS Symptoms: N      Sleep Medicine 8/9/2022   Sitting and reading 0   Watching TV 0   Sitting, inactive in a public place (e.g. a theatre or a meeting) 0   As a passenger in a car for an hour without a break 0   Lying down to rest in the afternoon when circumstances permit 0   Sitting and talking to someone 0   Sitting quietly after a lunch without alcohol 0   In a car, while stopped for a few minutes in traffic 0   Total score 0   Neck circumference (Inches) 17.5         PMH:  Past Medical History:   Diagnosis Date    A-fib ([address]     x1 isolated episode in 2009 ( controlled with diltiazem)  pt converted back on his own     Arthritis     Colon polyps     Gout     HTN (hypertension)     Hypercholesterolemia     Sleep apnea     uses a c-pap at night        PSH:  Past Surgical History:   Procedure Laterality Date    CATARACT REMOVAL WITH IMPLANT Left 09/22/2020    COLONOSCOPY      ENDOSCOPY, COLON, DIAGNOSTIC      INTRACAPSULAR CATARACT EXTRACTION Right 11/5/2019    RIGHT EYE CATARACT EMULSIFICATION IOL IMPLANT performed by Michelle Soliz MD at [address] [place] [address] Left 9/22/2020    LEFT EYE CATARACT EMULSIFICATION IOL IMPLANT (PT REQUESTS TO BE LAST) performed by Michelle Soliz MD at [address] Left 2007    knee-Yady    JOINT REPLACEMENT Right 2014    knee    REVISION TOTAL KNEE ARTHROPLASTY      TONSILLECTOMY          Soc Hx:  Social History     Tobacco Use    Smoking status: Never    Smokeless tobacco: Never   Vaping Use    Vaping Use: Never used   Substance Use Topics    Alcohol use: Yes     Alcohol/week: 0.0 standard drinks     Types: 1 - 2 Cans of beer per week     Comment: per week    Drug use: No        Fam Hx:  Family History   Problem Relation Age of Onset    Arthritis Mother     Heart Disease Mother         Current Outpatient Medications   Medication Sig Dispense Refill    clopidogrel (PLAVIX) 75 MG tablet Take 75 mg by mouth in the morning. Ascorbic Acid (VITAMIN C) 250 MG tablet Take 250 mg by mouth daily      olmesartan-hydroCHLOROthiazide (BENICAR HCT) 40-12.5 MG per tablet Take 1 tablet by mouth Daily with lunch       diltiazem (DILACOR XR) 240 MG XR capsule Take 240 mg by mouth nightly        No current facility-administered medications for this visit. Review of Systems  (Sleep ROS above)     Constitutional: no chills, no fever   Eyes: no blurred vision   Cardiovascular: no chest pain, no lower extremity edema. Respiratory: no cough, no shortness of breath   Gastrointestinal:  no nausea,  no vomiting, no diarrhea.    Neurological:  no dizziness,  no headache, no memory changes  Endocrine: No feelings of weakness    Objective:   Physical Exam  BP (!) 148/88 (Site: Left Upper Arm, Position: Sitting, Cuff Size: Large Adult)   Pulse 65   Resp 16   Ht 5' 11\" (1.803 m)   Wt 233 lb 9.6 oz (106 kg)   SpO2 97%   BMI 32.58 kg/m²      Additional Measurements    08/09/22 1502   Neck circumference (Inches): 17.5       General: No acute distress. BMI of Body mass index is 32.58 kg/m². HEENT: Normocephalic, atraumatic. No oropharyngeal lesions. Neck circumference (Inches): 17.5  Mallampati class- 3  Tonsils- Surgically removed  Neck: Trachea midline  Lungs: Clear to auscultation bilaterally. No wheezes or crackles  Cardiac: Regular rate and rhythm. No murmurs appreciated. Neurologic: Normal gait. Balance intact. Psychiatric: Alert and oriented. Appropriate affect. PERTINENT LAB RESULTS  TSH   Date Value Ref Range Status   10/01/2014 1.790 0.270 - 4.200 uIU/mL Final      No results found for: FERRITIN       Assessment:      Yokasta Perry was seen today for sleep apnea. Diagnoses and all orders for this visit:    Obstructive sleep apnea  -     Home Sleep Study; Future    Atrial fibrillation, unspecified type ([address]    Obesity, unspecified classification, unspecified obesity type, unspecified whether serious comorbidity present     Plan:      Taras Allen is a 74 y.o. male with  has a past medical history of A-fib ([address] Arthritis, Colon polyps, Gout, HTN (hypertension), Hypercholesterolemia, and Sleep apnea. He presents as a new patient to Sleep Clinic with longstanding history of HUMBERTO, with long term CPAP use. His current device is from 2015. Given length of time since last sleep study (20 years) will reevaluate severity level through a home sleep study, followed by new PAP therapy. 1. Obstructive Sleep Apnea     - Will proceed with home sleep study, per patient preference. Order placed [facility].  -Discussed pathophysiology of HUMBERTO and its impact on daily well-being, as well as cardiometabolic and neurocognitive health (particularly in moderate-severe cases). -Discussed CPAP as first-line and gold-standard therapy for HUMBERTO should diagnosis be confirmed.  Patient understands that CPAP should be worn every night for the duration of the night (in order to not miss therapy during early-morning REM period) for maximum benefit.   -Continue nightly use of current CPAP device until new one is obtained.  -Patient willing to proceed with CPAP treatment if indicated based on sleep study. Will order AUTO CPAP following.  -Reviewed new PAP Therapy instructions to be compliant with most insurance coverage:  - Use PAP device for atleast 4 hours nightly. - PAP therapy must be used for 70% of nights (5/7 nights per week)  - Patient must follow up in the clinic within 30-90 days from getting the PAP device.   -Informed patient to call office if he does not hear from sleep lab about scheduling within 1 to 2 weeks. 2. Atrial Fibrillation    - Managed by medications.  -Reviewed connection between untreated HUMBERTO and a-fib complications. 3. Obesity (Body mass index is 32.58 kg/m².)     -Discussed impact of weight gain on HUMBERTO severity. Patient understands that HUMBERTO severity may improve with weight loss but no guarantee of cure can be made. Return in about 4 months (around 12/9/2022) for Sleep Study Results, CPAP compliance, Follow up for sleep apnea.     Brisa Escobar, APRN-[facility]  [address] [facility]  p -[phone] option 2  f- [phone]

## 2022-08-12 ENCOUNTER — TELEPHONE (OUTPATIENT)
Dept: SLEEP CENTER | Age: 72
End: 2022-08-12

## 2022-08-17 ENCOUNTER — TELEPHONE (OUTPATIENT)
Dept: SLEEP CENTER | Age: 72
End: 2022-08-17

## 2022-09-06 ENCOUNTER — HOSPITAL ENCOUNTER (OUTPATIENT)
Dept: SLEEP CENTER | Age: 72
Discharge: HOME | End: 2022-09-06
Payer: MEDICARE

## 2022-09-06 DIAGNOSIS — G47.33 OBSTRUCTIVE SLEEP APNEA: ICD-10-CM

## 2022-09-06 PROCEDURE — 95800 SLP STDY UNATTENDED: CPT

## 2022-09-16 LAB — STATUS: NORMAL

## 2022-09-16 PROCEDURE — 95806 SLEEP STUDY UNATT&RESP EFFT: CPT | Performed by: STUDENT IN AN ORGANIZED HEALTH CARE EDUCATION/TRAINING PROGRAM

## 2022-09-19 DIAGNOSIS — G47.33 OBSTRUCTIVE SLEEP APNEA: Primary | ICD-10-CM

## 2022-09-20 ENCOUNTER — TELEPHONE (OUTPATIENT)
Dept: SLEEP CENTER | Age: 72
End: 2022-09-20

## 2022-09-20 NOTE — TELEPHONE ENCOUNTER
Call to pt discussed SS results and tx recommendation for auto cpap. Pt would like to come into the office to discuss SS results with NP.  Appt scheduled

## 2022-09-26 ENCOUNTER — OFFICE VISIT (OUTPATIENT)
Dept: SLEEP CENTER | Age: 72
End: 2022-09-26
Payer: MEDICARE

## 2022-09-26 VITALS
OXYGEN SATURATION: 96 % | HEIGHT: 71 IN | SYSTOLIC BLOOD PRESSURE: 157 MMHG | WEIGHT: 231.8 LBS | BODY MASS INDEX: 32.45 KG/M2 | RESPIRATION RATE: 16 BRPM | HEART RATE: 71 BPM | DIASTOLIC BLOOD PRESSURE: 76 MMHG

## 2022-09-26 DIAGNOSIS — E66.9 OBESITY, UNSPECIFIED CLASSIFICATION, UNSPECIFIED OBESITY TYPE, UNSPECIFIED WHETHER SERIOUS COMORBIDITY PRESENT: ICD-10-CM

## 2022-09-26 DIAGNOSIS — G47.33 OBSTRUCTIVE SLEEP APNEA: Primary | ICD-10-CM

## 2022-09-26 DIAGNOSIS — I48.91 ATRIAL FIBRILLATION, UNSPECIFIED TYPE (HCC): ICD-10-CM

## 2022-09-26 PROCEDURE — 99213 OFFICE O/P EST LOW 20 MIN: CPT | Performed by: NURSE PRACTITIONER

## 2022-09-26 PROCEDURE — 1123F ACP DISCUSS/DSCN MKR DOCD: CPT | Performed by: NURSE PRACTITIONER

## 2022-09-26 ASSESSMENT — SLEEP AND FATIGUE QUESTIONNAIRES
ESS TOTAL SCORE: 0
HOW LIKELY ARE YOU TO NOD OFF OR FALL ASLEEP WHEN YOU ARE A PASSENGER IN A CAR FOR AN HOUR WITHOUT A BREAK: 0
HOW LIKELY ARE YOU TO NOD OFF OR FALL ASLEEP WHILE SITTING AND TALKING TO SOMEONE: 0
HOW LIKELY ARE YOU TO NOD OFF OR FALL ASLEEP WHILE SITTING INACTIVE IN A PUBLIC PLACE: 0
HOW LIKELY ARE YOU TO NOD OFF OR FALL ASLEEP IN A CAR, WHILE STOPPED FOR A FEW MINUTES IN TRAFFIC: 0
HOW LIKELY ARE YOU TO NOD OFF OR FALL ASLEEP WHILE SITTING AND READING: 0
HOW LIKELY ARE YOU TO NOD OFF OR FALL ASLEEP WHILE SITTING QUIETLY AFTER LUNCH WITHOUT ALCOHOL: 0
HOW LIKELY ARE YOU TO NOD OFF OR FALL ASLEEP WHILE LYING DOWN TO REST IN THE AFTERNOON WHEN CIRCUMSTANCES PERMIT: 0
HOW LIKELY ARE YOU TO NOD OFF OR FALL ASLEEP WHILE WATCHING TV: 0

## 2022-09-26 NOTE — PROGRESS NOTES
[facility] Sleep Medicine    Patient Name: Alex Orta  Age: 74 y.o.   : 1950    Date of Visit: 22        Review of Last Visit Summary:    The patient was last seen on 2022 for Obstructive Sleep Apnea. Interim History:     Alex Orta is a 74 y.o. male that  has a past medical history of A-fib ([address] Arthritis, Colon polyps, Gout, HTN (hypertension), Hypercholesterolemia, and Sleep apnea. He presents as follow up to Sleep Clinic to review sleep study results. Interval Events:    -Completed home sleep study on 2022, however, about 2 hours through the night he felt he needed to put his CPAP on because he was having a difficult time breathing and sleeping without it.  -Currently using a [facility] [place] new CPAP device, current device is from .  -No new or worsening sleep complaints. Sleep Study History: Home Sleep Study on 22     AHI 15.0     SpO2 [place] 80%   T<88% was 1.4% of total oxygen evaluation period. Sleep History:    Patient presents for the management of his longstanding HUMBERTO, diagnosed 20 years ago. He is unsure of where his first and only sleep study was performed 20 years ago, but believes he was severe at the time. He was prescribed CPAP following and uses nightly. Patient's current CPAP device is from  and set to a pressure of 12 cmH2O. He is hoping to obtain a new CPAP device, as well as get set up with [employer] for new supplies. It has been [place] while\" since he received new supplies- was using a backup stock of masks he had but is now running low. Previously being serviced by [employer]. Using a nasal pillow mask with comfort. Patient estimates that he sleeps 7-7.5 hours each night. No difficulty falling asleep (within 30 min) and denies use of sleeping pills. Rarely wakes up through the night, and feels refreshed upon awakening.  Patient does not typically take planned naps, but will doze off after dinner occasionally. This just started happening within the last several years. Usually sleeps on his back. Drinks 2 cups of coffee in the am, denies smoking. With CPAP use, he notes resolution of snoring and witnessed apneas. He denies AM headaches, only mild sleepiness during the day, difficulty with memory, or drowsy driving. Patient \"can't sleep without CPAP\". This past year, patient has intentionally lost about 10-15 pounds. Bed time:  12:30 - 1 am   Wake time:  8 :30 am   Sleep Latency (min):  <30 min  Sleep Medications: None  Awakenings: Rarely wakes up   Estimated Sleep time (hours):  7-7.5  Daytime Naps: N, after dinner he may just doze off for a few minutes -- last few years   Sleep disturbances: None  Sleep Location: Bed  Sleep environment: Sleeps on back  Occupation: Retired     [employer] before bed: Dark environment   Caffeine:   2 cups in am  Coffee    0   Soda    0   Tea  Alcohol: rare  Tobacco: N     Sleep ROS:     Snoring: Y, without CPAP  Witnessed apneas: y, without CPAP  AM Headache: N  Dry Mouth: N  Daytime Sleepiness: Y- mild  Difficulty remembering things: N  MVA  or near miss accident due to sleepiness in the past? N  Tonsillectomy? Y, removed  Have you lost or gained weight recently?  Lost 12 pounds in the last year        PARASOMNIAS/ NARCOLEPSY:  Hypnogogic/Hynopompic Hallucinations: N   Sleep paralysis: N  Cataplexy: N   REM behavior symptoms: N  Sleep Walking: N  Sleep Talking: N  RLS Symptoms: N       Past Medical History:  Past Medical History:   Diagnosis Date    A-fib ([address]     x1 isolated episode in 2009 ( controlled with diltiazem)  pt converted back on his own     Arthritis     Colon polyps     Gout     HTN (hypertension)     Hypercholesterolemia     Sleep apnea     uses a c-pap at night       Past Surgical History:        Procedure Laterality Date    CATARACT REMOVAL WITH IMPLANT Left 09/22/2020    COLONOSCOPY      ENDOSCOPY, COLON, DIAGNOSTIC      INTRACAPSULAR CATARACT EXTRACTION Right 11/5/2019    RIGHT EYE CATARACT EMULSIFICATION IOL IMPLANT performed by Ulises Tomas MD at [address] [place] [address] Left 9/22/2020    LEFT EYE CATARACT EMULSIFICATION IOL IMPLANT (PT REQUESTS TO BE LAST) performed by Ulises Tomas MD at [address] [place] [address] Left 2007    knee-Yady    JOINT REPLACEMENT Right 2014    knee    REVISION TOTAL KNEE ARTHROPLASTY      TONSILLECTOMY         Allergies:  has No Known Allergies. Social History:    Social History     Tobacco Use    Smoking status: Never    Smokeless tobacco: Never   Vaping Use    Vaping Use: Never used   Substance Use Topics    Alcohol use:  Yes     Alcohol/week: 0.0 standard drinks     Types: 1 - 2 Cans of beer per week     Comment: per week    Drug use: No        Family History:       Problem Relation Age of Onset    Arthritis Mother     Heart Disease Mother        Current Medications:    Current Outpatient Medications:     clopidogrel (PLAVIX) 75 MG tablet, Take 75 mg by mouth in the morning., Disp: , Rfl:     Ascorbic Acid (VITAMIN C) 250 MG tablet, Take 250 mg by mouth daily, Disp: , Rfl:     olmesartan-hydroCHLOROthiazide (BENICAR HCT) 40-12.5 MG per tablet, Take 1 tablet by mouth Daily with lunch , Disp: , Rfl:     diltiazem (DILACOR XR) 240 MG XR capsule, Take 240 mg by mouth nightly , Disp: , Rfl:     Sleep Medicine 9/26/2022 8/9/2022   Sitting and reading 0 0   Watching TV 0 0   Sitting, inactive in a public place (e.g. a theatre or a meeting) 0 0   As a passenger in a car for an hour without a break 0 0   Lying down to rest in the afternoon when circumstances permit 0 0   Sitting and talking to someone 0 0   Sitting quietly after a lunch without alcohol 0 0   In a car, while stopped for a few minutes in traffic 0 0   [place] Sleepiness Score 0 0   Neck circumference (Inches) - 17.5       Review of Systems:    Constitutional: no chills, no fever   Eyes: no blurred vision   Cardiovascular: no chest pain,   Respiratory: no cough, no shortness of breath   Gastrointestinal:  no nausea,  no vomiting, no diarrhea. Musculoskeletal: no arthralgias, no back pain   Neurological:no dizziness,  no headache, no memory changes. Endocrine: No chills    Objective:   PHYSICAL EXAM:    BP (!) 157/76 (Site: Left Upper Arm, Position: Sitting, Cuff Size: Large Adult)   Pulse 71   Resp 16   Ht 5' 11\" (1.803 m)   Wt 231 lb 12.8 oz (105.1 kg)   SpO2 96%   BMI 32.33 kg/m²     Physical exam:  Gen: No acute distress. BMI of Body mass index is 32.33 kg/m². Neck: Trachea midline. No obvious mass. Neck circumference     Resp: No accessory muscle use. No crackles. No wheezes. No rhonchi. CV: Regular rate. Regular rhythm. No murmur or rub. Skin: Warm and dry. M/S: No cyanosis. No obvious joint deformity. Neuro: Awake. Alert. Moves all four extremities. Psych: Alert and oriented. No anxiety. Assessment:      Kenton Griffith was seen today for sleep apnea. Diagnoses and all orders for this visit:    Obstructive sleep apnea  -     DME Order for CPAP as OP    Atrial fibrillation, unspecified type ([address]    Obesity, unspecified classification, unspecified obesity type, unspecified whether serious comorbidity present     Plan:       1. Obstructive Sleep Apnea     -Reviewed HST results. Likely underestimation of breathing events due to putting CPAP on after 2 hours.  -Discussed pathophysiology of HUMBERTO and its impact on daily well-being, as well as cardiometabolic and neurocognitive health (particularly in moderate-severe cases). -Familiar with CPAP use, current device is 13years old and part of the [employer] recall. After discussion, patient elected to proceed with APAP trial. Will start APAP 12-17 cwp and follow up in 2-3 months.   -Reviewed new PAP therapy instructions to be compliant with most insurance coverage:  -Use PAP device for at least 4 hours nightly. -PAP therapy must be used for 70% of nights (5/7 nights per week)  -Patient must follow up in the clinic within 30-90 days from getting the PAP device.   -Provided handouts on HUMBERTO, CPAP, and sleep hygiene.  -Counseled on risks of driving while drowsy. 2. Atrial Fibrillation    -Managed by medications.  -Reviewed connection between untreated HUMBERTO and a-fib complications. 3. Obesity (Body mass index is 32.33 kg/m².)     -Previously discussed impact of weight gain on HUMBERTO severity. Patient understands that HUMBERTO severity may improve with weight loss but no guarantee of cure can be made. Return in about 3 months (around 12/26/2022) for CPAP compliance, Follow up for sleep apnea.     Guadalupe Chester, APRN-CNP  [address] [facility]  P -[phone] option 2  F- [phone]

## 2022-12-29 ENCOUNTER — OFFICE VISIT (OUTPATIENT)
Dept: SLEEP CENTER | Age: 72
End: 2022-12-29
Payer: MEDICARE

## 2022-12-29 VITALS
RESPIRATION RATE: 14 BRPM | HEART RATE: 69 BPM | DIASTOLIC BLOOD PRESSURE: 77 MMHG | HEIGHT: 71 IN | OXYGEN SATURATION: 97 % | WEIGHT: 229.8 LBS | BODY MASS INDEX: 32.17 KG/M2 | SYSTOLIC BLOOD PRESSURE: 156 MMHG

## 2022-12-29 DIAGNOSIS — G47.33 OBSTRUCTIVE SLEEP APNEA: Primary | ICD-10-CM

## 2022-12-29 DIAGNOSIS — E66.9 OBESITY, UNSPECIFIED CLASSIFICATION, UNSPECIFIED OBESITY TYPE, UNSPECIFIED WHETHER SERIOUS COMORBIDITY PRESENT: ICD-10-CM

## 2022-12-29 DIAGNOSIS — I48.91 ATRIAL FIBRILLATION, UNSPECIFIED TYPE (HCC): ICD-10-CM

## 2022-12-29 PROCEDURE — 99214 OFFICE O/P EST MOD 30 MIN: CPT | Performed by: NURSE PRACTITIONER

## 2022-12-29 PROCEDURE — 3074F SYST BP LT 130 MM HG: CPT | Performed by: NURSE PRACTITIONER

## 2022-12-29 PROCEDURE — 1123F ACP DISCUSS/DSCN MKR DOCD: CPT | Performed by: NURSE PRACTITIONER

## 2022-12-29 PROCEDURE — 3078F DIAST BP <80 MM HG: CPT | Performed by: NURSE PRACTITIONER

## 2022-12-29 ASSESSMENT — SLEEP AND FATIGUE QUESTIONNAIRES
HOW LIKELY ARE YOU TO NOD OFF OR FALL ASLEEP IN A CAR, WHILE STOPPED FOR A FEW MINUTES IN TRAFFIC: 0
HOW LIKELY ARE YOU TO NOD OFF OR FALL ASLEEP WHILE SITTING INACTIVE IN A PUBLIC PLACE: 0
HOW LIKELY ARE YOU TO NOD OFF OR FALL ASLEEP WHILE SITTING AND TALKING TO SOMEONE: 0
HOW LIKELY ARE YOU TO NOD OFF OR FALL ASLEEP WHILE SITTING QUIETLY AFTER LUNCH WITHOUT ALCOHOL: 0
HOW LIKELY ARE YOU TO NOD OFF OR FALL ASLEEP WHEN YOU ARE A PASSENGER IN A CAR FOR AN HOUR WITHOUT A BREAK: 0
HOW LIKELY ARE YOU TO NOD OFF OR FALL ASLEEP WHILE WATCHING TV: 0
HOW LIKELY ARE YOU TO NOD OFF OR FALL ASLEEP WHILE LYING DOWN TO REST IN THE AFTERNOON WHEN CIRCUMSTANCES PERMIT: 0
HOW LIKELY ARE YOU TO NOD OFF OR FALL ASLEEP WHILE SITTING AND READING: 0
ESS TOTAL SCORE: 0

## 2022-12-29 NOTE — PROGRESS NOTES
[facility] Sleep Medicine    Patient Name: Esther Arambula  Age: 74 y.o.   : 1950    Date of Visit: 22        Review of Last Visit Summary:    The patient was last seen on 2022 for  Obstructive Sleep Apnea. Interim History:     Esther Arambula is a 74 y.o. male that  has a past medical history of A-fib ([address] Arthritis, Colon polyps, Gout, HTN (hypertension), Hypercholesterolemia, and Sleep apnea. He presents in follow up to Sleep Clinic to review CPAP adherence and efficacy. Interval Events:    -Set up with new CPAP on 10/5/22 following a home sleep study that was performed only partially without his CPAP because he cannot tolerate sleeping without it. Was previously using a [employer] recalled device and is happy with new device.  -Benefiting from use, sleeping well. -Using a nasal mask, no significant leak. -Doesn't use humidifier.   -Requests an increase in RAMP time (currently 5 min). -Comfortable with pressure. DME: [facility]    Sleep Study History: Home Sleep Study on 22     AHI 15.0     SpO2 [place] 80%   T<88% was 1.4% of total oxygen evaluation period. Sleep History:  Patient presents for the management of his longstanding HUMBERTO, diagnosed 20 years ago. He is unsure of where his first and only sleep study was performed 20 years ago, but believes he was severe at the time. He was prescribed CPAP following and uses nightly. Patient's current CPAP device is from  and set to a pressure of 12 cmH2O. He is hoping to obtain a new CPAP device, as well as get set up with [employer] for new supplies. It has been [place] while\" since he received new supplies- was using a backup stock of masks he had but is now running low. Previously being serviced by [employer]. Using a nasal pillow mask with comfort. Patient estimates that he sleeps 7-7.5 hours each night. No difficulty falling asleep (within 30 min) and denies use of sleeping pills.  Rarely wakes up through the night, and feels refreshed upon awakening. Patient does not typically take planned naps, but will doze off after dinner occasionally. This just started happening within the last several years. Usually sleeps on his back. Drinks 2 cups of coffee in the am, denies smoking. With CPAP use, he notes resolution of snoring and witnessed apneas. He denies AM headaches, only mild sleepiness during the day, difficulty with memory, or drowsy driving. Patient \"can't sleep without CPAP\". This past year, patient has intentionally lost about 10-15 pounds. Bed time:  12:30 - 1 am   Wake time:  8 :30 am   Sleep Latency (min):  <30 min  Sleep Medications: None  Awakenings: Rarely wakes up   Estimated Sleep time (hours):  7-7.5  Daytime Naps: N, after dinner he may just doze off for a few minutes -- last few years   Sleep disturbances: None  Sleep Location: Bed  Sleep environment: Sleeps on back  Occupation: Retired     [address] before bed: Dark environment   Caffeine:   2 cups in am  Coffee    0   Soda    0   Tea  Alcohol: rare  Tobacco: N     Sleep ROS:     Snoring: Y, without CPAP  Witnessed apneas: y, without CPAP  AM Headache: N  Dry Mouth: N  Daytime Sleepiness: Y- mild  Difficulty remembering things: N  MVA  or near miss accident due to sleepiness in the past? N  Tonsillectomy? Y, removed  Have you lost or gained weight recently?  Lost 12 pounds in the last year        PARASOMNIAS/ NARCOLEPSY:  Hypnogogic/Hynopompic Hallucinations: N   Sleep paralysis: N  Cataplexy: N   REM behavior symptoms: N  Sleep Walking: N  Sleep Talking: N  RLS Symptoms: N  Past Medical History:  Past Medical History:   Diagnosis Date    A-fib ([facility] [address] [facility]     x1 isolated episode in 2009 ( controlled with diltiazem)  pt converted back on his own     Arthritis     Colon polyps     Gout     HTN (hypertension)     Hypercholesterolemia     Sleep apnea     uses a c-pap at night       Past Surgical History: Procedure Laterality Date    CATARACT REMOVAL WITH IMPLANT Left 09/22/2020    COLONOSCOPY      ENDOSCOPY, COLON, DIAGNOSTIC      INTRACAPSULAR CATARACT EXTRACTION Right 11/5/2019    RIGHT EYE CATARACT EMULSIFICATION IOL IMPLANT performed by Kenton Wilkerson MD at [address] [place] [address] Left 9/22/2020    LEFT EYE CATARACT EMULSIFICATION IOL IMPLANT (PT REQUESTS TO BE LAST) performed by Kenton Wilkerson MD at [address] [place] [address] Left 2007    knee-Yady    JOINT REPLACEMENT Right 2014    knee    REVISION TOTAL KNEE ARTHROPLASTY      TONSILLECTOMY         Allergies:  has No Known Allergies. Social History:    Social History     Tobacco Use    Smoking status: Never    Smokeless tobacco: Never   Vaping Use    Vaping Use: Never used   Substance Use Topics    Alcohol use:  Yes     Alcohol/week: 0.0 standard drinks     Types: 1 - 2 Cans of beer per week     Comment: per week    Drug use: No        Family History:       Problem Relation Age of Onset    Arthritis Mother     Heart Disease Mother        Current Medications:    Current Outpatient Medications:     clopidogrel (PLAVIX) 75 MG tablet, Take 75 mg by mouth in the morning., Disp: , Rfl:     Ascorbic Acid (VITAMIN C) 250 MG tablet, Take 250 mg by mouth daily, Disp: , Rfl:     olmesartan-hydroCHLOROthiazide (BENICAR HCT) 40-12.5 MG per tablet, Take 1 tablet by mouth Daily with lunch , Disp: , Rfl:     diltiazem (DILACOR XR) 240 MG XR capsule, Take 240 mg by mouth nightly , Disp: , Rfl:     Sleep Medicine 12/29/2022 9/26/2022 8/9/2022   Sitting and reading 0 0 0   Watching TV 0 0 0   Sitting, inactive in a public place (e.g. a theatre or a meeting) 0 0 0   As a passenger in a car for an hour without a break 0 0 0   Lying down to rest in the afternoon when circumstances permit 0 0 0   Sitting and talking to someone 0 0 0   Sitting quietly after a lunch without alcohol 0 0 0   In a car, while stopped for a few minutes in traffic 0 0 0   [place] Sleepiness Score 0 0 0   Neck circumference (Inches) - - 17.5       Review of Systems:    Constitutional: no chills, no fever   Eyes: no blurred vision   Cardiovascular: no chest pain,   Respiratory: no cough, no shortness of breath   Gastrointestinal:  no nausea,  no vomiting, no diarrhea. Musculoskeletal: no arthralgias, no back pain   Neurological:  no dizziness,  no headache, no memory changes. Endocrine: No chills    Objective:   PHYSICAL EXAM:    BP (!) 156/77 (Site: Left Upper Arm, Position: Sitting, Cuff Size: Large Adult)   Pulse 69   Resp 14   Ht 5' 11\" (1.803 m)   Wt 229 lb 12.8 oz (104.2 kg)   SpO2 97%   BMI 32.05 kg/m²     Physical exam:  Gen: No acute distress. BMI of Body mass index is 32.05 kg/m². Neck: Trachea midline. No obvious mass. Neck circumference     Resp: No accessory muscle use. No crackles. No wheezes. No rhonchi. Skin: Warm and dry. M/S: No cyanosis. No obvious joint deformity. Neuro: Awake. Alert. Moves all four extremities. Psych: Alert and oriented. No anxiety. CPAP Compliance Download -- accessed via [employer] . Set up 10/5/22          Assessment:      Wallace Ferrara was seen today for sleep apnea. Diagnoses and all orders for this visit:    Obstructive sleep apnea  -     DME Order for CPAP as OP    Atrial fibrillation, unspecified type ([address]    Obesity, unspecified classification, unspecified obesity type, unspecified whether serious comorbidity present     Plan:     Vita Roland is a 74 y.o. male that  has a past medical history of A-fib ([address] Arthritis, Colon polyps, Gout, HTN (hypertension), Hypercholesterolemia, and Sleep apnea. He presents in follow up to Sleep Clinic to review CPAP adherence and efficacy. He demonstrates excellent adherence with resolution of respiratory events (residual AHI 2.1).  He notes benefit with CPAP therapy (namely, more consolidated and refreshing sleep, more energy during the day) and is motivated to continue use. Insurance compliance requirements met. Obstructive Sleep Apnea    -Based on sleep study results, review of device remote download, and discussion with patient, will continue auto-CPAP therapy at settings of 12-17 cm of water. RAMP time increased from 5 minutes to 10 minutes. - Settings are appropriate, with residual AHI 2.1 and maximum and average pressures within prescribed range. - DME is [facility]. - Patient is using a nasal mask. No significant leak. -Would like screen light sensor to stay on- will reach out to DME to see if they can help him to fix this.  -Previously discussed pathophysiology of HUMBERTO and its impact on daily well-being, as well as cardiometabolic and neurocognitive health (particularly in moderate-severe cases). -Patient understands that CPAP should be worn every night for the duration of the night (in order to not miss therapy during early-morning REM period) for maximum benefit. -Insurance compliance requirements met. 2. Atrial Fibrillation    -Managed by medications.  -Previously reviewed connection between untreated HUMBERTO and a-fib complications. 3. Obesity. Body mass index is 32.05 kg/m². -Previously discussed impact of weight gain on HUMBERTO severity. Patient understands that HUMBERTO severity may improve with weight loss but no guarantee of cure can be made. Return in about 1 year (around 12/29/2023) for Follow up for sleep apnea.     ESTHELA Mac-CNP  [facility]  p -[phone] option 2  f- [phone]

## 2023-12-28 ENCOUNTER — OFFICE VISIT (OUTPATIENT)
Dept: SLEEP CENTER | Age: 73
End: 2023-12-28
Payer: MEDICARE

## 2023-12-28 VITALS
RESPIRATION RATE: 17 BRPM | DIASTOLIC BLOOD PRESSURE: 76 MMHG | HEART RATE: 81 BPM | SYSTOLIC BLOOD PRESSURE: 146 MMHG | OXYGEN SATURATION: 96 % | BODY MASS INDEX: 32.81 KG/M2 | WEIGHT: 234.35 LBS | HEIGHT: 71 IN

## 2023-12-28 DIAGNOSIS — G47.33 OBSTRUCTIVE SLEEP APNEA: Primary | ICD-10-CM

## 2023-12-28 DIAGNOSIS — I48.91 ATRIAL FIBRILLATION, UNSPECIFIED TYPE (HCC): ICD-10-CM

## 2023-12-28 PROCEDURE — 99214 OFFICE O/P EST MOD 30 MIN: CPT | Performed by: NURSE PRACTITIONER

## 2023-12-28 PROCEDURE — 1123F ACP DISCUSS/DSCN MKR DOCD: CPT | Performed by: NURSE PRACTITIONER

## 2023-12-28 PROCEDURE — 3078F DIAST BP <80 MM HG: CPT | Performed by: NURSE PRACTITIONER

## 2023-12-28 PROCEDURE — 3077F SYST BP >= 140 MM HG: CPT | Performed by: NURSE PRACTITIONER

## 2023-12-28 NOTE — PROGRESS NOTES
Settings are appropriate, with residual AHI 2.3 and maximum and average pressures within prescribed range. - DME is [facility]. - Patient is using a nasal mask. No significant leak. Supply order placed.  -Previously discussed pathophysiology of HUMBERTO and its impact on daily well-being, as well as cardiometabolic and neurocognitive health (particularly in moderate-severe cases). -Patient understands that CPAP should be worn every night for the duration of the night (in order to not miss therapy during early-morning REM period) for maximum benefit. 2. Atrial Fibrillation    -Previously reviewed connection between untreated HUMBERTO and a-fib. Return in about 1 year (around 12/28/2024) for Follow up for sleep apnea.     Dennys Bauer, ESTHELA-[phone]   [phone]

## 2024-04-01 ENCOUNTER — OFFICE VISIT (OUTPATIENT)
Dept: FAMILY MEDICINE CLINIC | Age: 74
End: 2024-04-01
Payer: MEDICARE

## 2024-04-01 ENCOUNTER — TELEPHONE (OUTPATIENT)
Dept: SLEEP CENTER | Age: 74
End: 2024-04-01

## 2024-04-01 VITALS
TEMPERATURE: 97.4 F | BODY MASS INDEX: 32.76 KG/M2 | HEART RATE: 73 BPM | SYSTOLIC BLOOD PRESSURE: 135 MMHG | HEIGHT: 71 IN | RESPIRATION RATE: 19 BRPM | DIASTOLIC BLOOD PRESSURE: 78 MMHG | OXYGEN SATURATION: 98 % | WEIGHT: 234 LBS

## 2024-04-01 DIAGNOSIS — H61.22 HEARING LOSS OF LEFT EAR DUE TO CERUMEN IMPACTION: Primary | ICD-10-CM

## 2024-04-01 DIAGNOSIS — H65.02 ACUTE SEROUS OTITIS MEDIA OF LEFT EAR, RECURRENCE NOT SPECIFIED: ICD-10-CM

## 2024-04-01 PROCEDURE — 1123F ACP DISCUSS/DSCN MKR DOCD: CPT

## 2024-04-01 PROCEDURE — 3078F DIAST BP <80 MM HG: CPT

## 2024-04-01 PROCEDURE — 99213 OFFICE O/P EST LOW 20 MIN: CPT

## 2024-04-01 PROCEDURE — 3075F SYST BP GE 130 - 139MM HG: CPT

## 2024-04-01 RX ORDER — AMOXICILLIN 875 MG/1
875 TABLET, COATED ORAL 2 TIMES DAILY
Qty: 20 TABLET | Refills: 0 | Status: SHIPPED | OUTPATIENT
Start: 2024-04-01 | End: 2024-04-11

## 2024-04-01 RX ORDER — FLUTICASONE PROPIONATE 50 MCG
1 SPRAY, SUSPENSION (ML) NASAL 2 TIMES DAILY
Qty: 16 G | Refills: 0 | Status: SHIPPED | OUTPATIENT
Start: 2024-04-01

## 2024-04-01 SDOH — ECONOMIC STABILITY: FOOD INSECURITY: WITHIN THE PAST 12 MONTHS, YOU WORRIED THAT YOUR FOOD WOULD RUN OUT BEFORE YOU GOT MONEY TO BUY MORE.: NEVER TRUE

## 2024-04-01 SDOH — ECONOMIC STABILITY: HOUSING INSECURITY
IN THE LAST 12 MONTHS, WAS THERE A TIME WHEN YOU DID NOT HAVE A STEADY PLACE TO SLEEP OR SLEPT IN A SHELTER (INCLUDING NOW)?: NO

## 2024-04-01 SDOH — ECONOMIC STABILITY: FOOD INSECURITY: WITHIN THE PAST 12 MONTHS, THE FOOD YOU BOUGHT JUST DIDN'T LAST AND YOU DIDN'T HAVE MONEY TO GET MORE.: NEVER TRUE

## 2024-04-01 SDOH — ECONOMIC STABILITY: INCOME INSECURITY: HOW HARD IS IT FOR YOU TO PAY FOR THE VERY BASICS LIKE FOOD, HOUSING, MEDICAL CARE, AND HEATING?: NOT HARD AT ALL

## 2024-04-01 ASSESSMENT — PATIENT HEALTH QUESTIONNAIRE - PHQ9
SUM OF ALL RESPONSES TO PHQ QUESTIONS 1-9: 0
DEPRESSION UNABLE TO ASSESS: FUNCTIONAL CAPACITY MOTIVATION LIMITS ACCURACY
SUM OF ALL RESPONSES TO PHQ9 QUESTIONS 1 & 2: 0
SUM OF ALL RESPONSES TO PHQ QUESTIONS 1-9: 0
1. LITTLE INTEREST OR PLEASURE IN DOING THINGS: NOT AT ALL
2. FEELING DOWN, DEPRESSED OR HOPELESS: NOT AT ALL

## 2024-04-01 NOTE — TELEPHONE ENCOUNTER
Patient is asking to speak with Rhonda directly in regards to his CPAP machine.He states that the pressures may be more than what he is used to. He is sleeping more. Just would like to discuss with Rhonda. Please contact patient to discuss

## 2024-04-01 NOTE — PROGRESS NOTES
reevaluation. Red flag symptoms were also discussed with the patient today. If symptoms worsen the patient is to go directly to the emergency department for reevaluation and treatment. Pt verbalizes understanding and is in agreement with plan of care. All questions answered.    SIGNATURE: ESTHELA Gasac CNP      *NOTE: This report was transcribed using voice recognition software. Every effort was made to ensure accuracy; however, inadvertent computerized transcription errors may be present.

## 2025-05-27 ENCOUNTER — OFFICE VISIT (OUTPATIENT)
Dept: SLEEP CENTER | Age: 75
End: 2025-05-27
Payer: MEDICARE

## 2025-05-27 VITALS
TEMPERATURE: 98.4 F | SYSTOLIC BLOOD PRESSURE: 147 MMHG | HEIGHT: 71 IN | RESPIRATION RATE: 16 BRPM | HEART RATE: 65 BPM | WEIGHT: 220.46 LBS | DIASTOLIC BLOOD PRESSURE: 77 MMHG | OXYGEN SATURATION: 96 % | BODY MASS INDEX: 30.86 KG/M2

## 2025-05-27 DIAGNOSIS — G47.33 OBSTRUCTIVE SLEEP APNEA: Primary | ICD-10-CM

## 2025-05-27 PROCEDURE — 1159F MED LIST DOCD IN RCRD: CPT | Performed by: NURSE PRACTITIONER

## 2025-05-27 PROCEDURE — 3077F SYST BP >= 140 MM HG: CPT | Performed by: NURSE PRACTITIONER

## 2025-05-27 PROCEDURE — 1160F RVW MEDS BY RX/DR IN RCRD: CPT | Performed by: NURSE PRACTITIONER

## 2025-05-27 PROCEDURE — 1123F ACP DISCUSS/DSCN MKR DOCD: CPT | Performed by: NURSE PRACTITIONER

## 2025-05-27 PROCEDURE — 99213 OFFICE O/P EST LOW 20 MIN: CPT | Performed by: NURSE PRACTITIONER

## 2025-05-27 PROCEDURE — 3078F DIAST BP <80 MM HG: CPT | Performed by: NURSE PRACTITIONER

## 2025-05-27 RX ORDER — ALLOPURINOL 100 MG/1
100 TABLET ORAL DAILY
COMMUNITY

## 2025-05-27 ASSESSMENT — SLEEP AND FATIGUE QUESTIONNAIRES
ESS TOTAL SCORE: 3
HOW LIKELY ARE YOU TO NOD OFF OR FALL ASLEEP WHILE WATCHING TV: SLIGHT CHANCE OF DOZING
HOW LIKELY ARE YOU TO NOD OFF OR FALL ASLEEP WHEN YOU ARE A PASSENGER IN A CAR FOR AN HOUR WITHOUT A BREAK: WOULD NEVER DOZE
HOW LIKELY ARE YOU TO NOD OFF OR FALL ASLEEP WHILE SITTING QUIETLY AFTER LUNCH WITHOUT ALCOHOL: SLIGHT CHANCE OF DOZING
HOW LIKELY ARE YOU TO NOD OFF OR FALL ASLEEP WHILE SITTING AND TALKING TO SOMEONE: WOULD NEVER DOZE
HOW LIKELY ARE YOU TO NOD OFF OR FALL ASLEEP WHILE SITTING INACTIVE IN A PUBLIC PLACE: WOULD NEVER DOZE
HOW LIKELY ARE YOU TO NOD OFF OR FALL ASLEEP IN A CAR, WHILE STOPPED FOR A FEW MINUTES IN TRAFFIC: WOULD NEVER DOZE
HOW LIKELY ARE YOU TO NOD OFF OR FALL ASLEEP WHILE SITTING AND READING: WOULD NEVER DOZE
HOW LIKELY ARE YOU TO NOD OFF OR FALL ASLEEP WHILE LYING DOWN TO REST IN THE AFTERNOON WHEN CIRCUMSTANCES PERMIT: SLIGHT CHANCE OF DOZING

## 2025-05-27 NOTE — PROGRESS NOTES
09/22/2020    COLONOSCOPY      ENDOSCOPY, COLON, DIAGNOSTIC      INTRACAPSULAR CATARACT EXTRACTION Right 11/5/2019    RIGHT EYE CATARACT EMULSIFICATION IOL IMPLANT performed by Blair OLSON MD at [facility] OR    INTRACAPSULAR CATARACT EXTRACTION Left 9/22/2020    LEFT EYE CATARACT EMULSIFICATION IOL IMPLANT (PT REQUESTS TO BE LAST) performed by Blair OLSON MD at [facility] OR    JOINT REPLACEMENT Left 2007    knee-Yady    JOINT REPLACEMENT Right 2014    knee    REVISION TOTAL KNEE ARTHROPLASTY      TONSILLECTOMY         Allergies:  is allergic to clindamycin/lincomycin.  Social History:    Social History     Tobacco Use    Smoking status: Never    Smokeless tobacco: Never   Vaping Use    Vaping status: Never Used   Substance Use Topics    Alcohol use: Yes     Alcohol/week: 0.0 standard drinks of alcohol     Types: 1 - 2 Cans of beer per week     Comment: per week    Drug use: No        Family History:       Problem Relation Age of Onset    Arthritis Mother     Heart Disease Mother        Current Medications:    Current Outpatient Medications:     allopurinol (ZYLOPRIM) 100 MG tablet, Take 1 tablet by mouth daily, Disp: , Rfl:     clopidogrel (PLAVIX) 75 MG tablet, Take 1 tablet by mouth daily, Disp: , Rfl:     Ascorbic Acid (VITAMIN C) 250 MG tablet, Take 1 tablet by mouth daily, Disp: , Rfl:     olmesartan-hydroCHLOROthiazide (BENICAR HCT) 40-12.5 MG per tablet, Take 1 tablet by mouth Daily with lunch, Disp: , Rfl:     diltiazem (DILACOR XR) 240 MG XR capsule, Take 1 capsule by mouth nightly, Disp: , Rfl:     fluticasone (FLONASE) 50 MCG/ACT nasal spray, 1 spray by Each Nostril route 2 times daily (Patient not taking: Reported on 5/27/2025), Disp: 16 g, Rfl: 0        5/27/2025     2:50 PM 12/28/2023     1:57 PM 12/29/2022     2:07 PM 9/26/2022     2:13 PM 8/9/2022     3:04 PM 8/9/2022     3:02 PM   Sleep Medicine   Sitting and reading 0 0 0 0 0    Watching TV 1 1 0 0 0    Sitting, inactive in a

## 2025-08-01 ENCOUNTER — TRANSCRIBE ORDERS (OUTPATIENT)
Dept: ADMINISTRATIVE | Age: 75
End: 2025-08-01

## 2025-08-01 DIAGNOSIS — N18.31 CHRONIC KIDNEY DISEASE (CKD) STAGE G3A/A1, MODERATELY DECREASED GLOMERULAR FILTRATION RATE (GFR) BETWEEN 45-59 ML/MIN/1.73 SQUARE METER AND ALBUMINURIA CREATININE RATIO LESS THAN 30 MG/G (HCC): Primary | ICD-10-CM

## (undated) DEVICE — SOLUTION SCRB 32OZ 7.5% POVIDONE IOD BTL GENTLE EFFECTIVE

## (undated) DEVICE — SOLUTION IV IRRIG POUR BRL 0.9% SODIUM CHL 2F7124

## (undated) DEVICE — SURGICAL PROCEDURE PACK CATRCT LT EYE BASIC CUST ST JOS LF

## (undated) DEVICE — ENCORE® LATEX MICRO SIZE 8.5, STERILE LATEX POWDER-FREE SURGICAL GLOVE: Brand: ENCORE

## (undated) DEVICE — Device

## (undated) DEVICE — SOLUTION IV IRRIG WATER 1000ML POUR BRL 2F7114

## (undated) DEVICE — 3 ML SYRINGE LUER-LOCK TIP: Brand: MONOJECT

## (undated) DEVICE — STERILE POLYISOPRENE POWDER-FREE SURGICAL GLOVES: Brand: PROTEXIS

## (undated) DEVICE — SUTURE ETHLN 10-0 L12IN NONABSORBABLE BLK CS140-8 L6.5MM 9033G